# Patient Record
Sex: FEMALE | Race: BLACK OR AFRICAN AMERICAN | Employment: FULL TIME | URBAN - METROPOLITAN AREA
[De-identification: names, ages, dates, MRNs, and addresses within clinical notes are randomized per-mention and may not be internally consistent; named-entity substitution may affect disease eponyms.]

---

## 2019-04-10 ENCOUNTER — HOSPITAL ENCOUNTER (EMERGENCY)
Age: 29
Discharge: HOME | End: 2019-04-10
Payer: SELF-PAY

## 2019-04-10 VITALS
HEART RATE: 82 BPM | WEIGHT: 135 LBS | BODY MASS INDEX: 24.84 KG/M2 | TEMPERATURE: 98.6 F | SYSTOLIC BLOOD PRESSURE: 109 MMHG | DIASTOLIC BLOOD PRESSURE: 60 MMHG | OXYGEN SATURATION: 100 % | RESPIRATION RATE: 18 BRPM | HEIGHT: 62 IN

## 2019-04-10 DIAGNOSIS — K62.89 ANAL PAIN: Primary | ICD-10-CM

## 2019-04-10 DIAGNOSIS — N89.8 VAGINAL DISCHARGE: ICD-10-CM

## 2019-04-10 DIAGNOSIS — K59.00 CONSTIPATION, UNSPECIFIED CONSTIPATION TYPE: ICD-10-CM

## 2019-04-10 LAB
HCG UR QL: NEGATIVE
SERVICE CMNT-IMP: NORMAL
WET PREP GENITAL: NORMAL
WET PREP GENITAL: NORMAL

## 2019-04-10 PROCEDURE — 87210 SMEAR WET MOUNT SALINE/INK: CPT

## 2019-04-10 PROCEDURE — 96372 THER/PROPH/DIAG INJ SC/IM: CPT

## 2019-04-10 PROCEDURE — 81003 URINALYSIS AUTO W/O SCOPE: CPT

## 2019-04-10 PROCEDURE — 81025 URINE PREGNANCY TEST: CPT

## 2019-04-10 PROCEDURE — 74011250637 HC RX REV CODE- 250/637

## 2019-04-10 PROCEDURE — 99284 EMERGENCY DEPT VISIT MOD MDM: CPT

## 2019-04-10 PROCEDURE — 87491 CHLMYD TRACH DNA AMP PROBE: CPT

## 2019-04-10 PROCEDURE — 74011250636 HC RX REV CODE- 250/636

## 2019-04-10 RX ORDER — AZITHROMYCIN 250 MG/1
1000 TABLET, FILM COATED ORAL
Status: COMPLETED | OUTPATIENT
Start: 2019-04-10 | End: 2019-04-10

## 2019-04-10 RX ORDER — POLYETHYLENE GLYCOL 3350 17 G/17G
17 POWDER, FOR SOLUTION ORAL DAILY
Qty: 17 EACH | Refills: 0 | Status: SHIPPED | OUTPATIENT
Start: 2019-04-10

## 2019-04-10 RX ORDER — HYDROCORTISONE ACETATE 25 MG/1
25 SUPPOSITORY RECTAL EVERY 12 HOURS
Qty: 10 SUPPOSITORY | Refills: 0 | Status: SHIPPED | OUTPATIENT
Start: 2019-04-10

## 2019-04-10 RX ADMIN — AZITHROMYCIN 1000 MG: 250 TABLET, FILM COATED ORAL at 09:01

## 2019-04-10 RX ADMIN — LIDOCAINE HYDROCHLORIDE 250 MG: 10 INJECTION, SOLUTION INFILTRATION; PERINEURAL at 09:01

## 2019-04-10 NOTE — ED TRIAGE NOTES
Pt in states rectal pain. States she was constipated and strained to have bowel movement states now having itching to rectum. States also having vaginal discharge and itching.

## 2019-04-10 NOTE — ED PROVIDER NOTES
40-year-old female complaining of anal pain. States it may be her hemorrhoids. Although she's never been diagnosed with hemorrhoids. Patient states that she has had constipation with hard bowel movements recently, which showed that this irritated. She also check while she is here for vaginal discharge. The history is provided by the patient. Anal Pain The current episode started yesterday. The onset was sudden. The problem occurs continuously. The pain is moderate. The stool is described as hard. Associated symptoms include rectal pain. Pertinent negatives include no anorexia. History reviewed. No pertinent past medical history. Past Surgical History:  
Procedure Laterality Date  HX GYN    
 c section History reviewed. No pertinent family history. Social History Socioeconomic History  Marital status: SINGLE Spouse name: Not on file  Number of children: Not on file  Years of education: Not on file  Highest education level: Not on file Occupational History  Not on file Social Needs  Financial resource strain: Not on file  Food insecurity:  
  Worry: Not on file Inability: Not on file  Transportation needs:  
  Medical: Not on file Non-medical: Not on file Tobacco Use  Smoking status: Never Smoker  Smokeless tobacco: Never Used Substance and Sexual Activity  Alcohol use: No  
  Comment: occ  Drug use: No  
 Sexual activity: Yes Birth control/protection: None Lifestyle  Physical activity:  
  Days per week: Not on file Minutes per session: Not on file  Stress: Not on file Relationships  Social connections:  
  Talks on phone: Not on file Gets together: Not on file Attends Gnosticist service: Not on file Active member of club or organization: Not on file Attends meetings of clubs or organizations: Not on file Relationship status: Not on file  Intimate partner violence: Fear of current or ex partner: Not on file Emotionally abused: Not on file Physically abused: Not on file Forced sexual activity: Not on file Other Topics Concern  Not on file Social History Narrative  Not on file ALLERGIES: Patient has no known allergies. Review of Systems Constitutional: Negative. Negative for activity change. HENT: Negative. Eyes: Negative. Respiratory: Negative. Cardiovascular: Negative. Gastrointestinal: Positive for rectal pain. Negative for anorexia. Genitourinary: Negative. Musculoskeletal: Negative. Skin: Negative. Neurological: Negative. Psychiatric/Behavioral: Negative. All other systems reviewed and are negative. Vitals:  
 04/10/19 3636 04/10/19 1529 BP: 130/60 Pulse: 88 Resp: 18 Temp: 98.6 °F (37 °C) SpO2: 99% 99% Weight: 61.2 kg (135 lb) Height: 5' 2\" (1.575 m) Physical Exam  
Constitutional: She is oriented to person, place, and time. She appears well-developed and well-nourished. No distress. HENT:  
Head: Normocephalic and atraumatic. Right Ear: External ear normal.  
Left Ear: External ear normal.  
Nose: Nose normal.  
Mouth/Throat: Oropharynx is clear and moist. No oropharyngeal exudate. Eyes: Pupils are equal, round, and reactive to light. Conjunctivae and EOM are normal. Right eye exhibits no discharge. Left eye exhibits no discharge. No scleral icterus. Neck: Normal range of motion. Neck supple. No JVD present. No tracheal deviation present. Cardiovascular: Normal rate, regular rhythm and intact distal pulses. Pulmonary/Chest: Effort normal and breath sounds normal. No stridor. No respiratory distress. She has no wheezes. She exhibits no tenderness. Abdominal: Soft. Bowel sounds are normal. She exhibits no distension and no mass. There is no tenderness. Genitourinary: Rectal exam shows no external hemorrhoid.  There is no tenderness, lesion or injury on the right labia. There is no tenderness, lesion or injury on the left labia. Cervix exhibits no discharge. Right adnexum displays no mass, no tenderness and no fullness. Left adnexum displays no mass, no tenderness and no fullness. No erythema, tenderness or bleeding in the vagina. No foreign body in the vagina. No signs of injury around the vagina. No vaginal discharge found. Musculoskeletal: Normal range of motion. She exhibits no edema or tenderness. Neurological: She is alert and oriented to person, place, and time. No cranial nerve deficit. Skin: Skin is warm and dry. No rash noted. She is not diaphoretic. No erythema. No pallor. Psychiatric: She has a normal mood and affect. Her behavior is normal. Thought content normal.  
Nursing note and vitals reviewed. MDM Number of Diagnoses or Management Options Diagnosis management comments: Anal pain, constipation, vaginal discharge. Plan: [facility] [place]. cover for STD check for trichomoniasis and BV. Plan: Encourage patient to see a primary care doctor for nonemergency conditions. Amount and/or Complexity of Data Reviewed Clinical lab tests: ordered and reviewed Tests in the medicine section of CPT®: ordered and reviewed Risk of Complications, Morbidity, and/or Mortality Presenting problems: low Diagnostic procedures: moderate Management options: low Procedures

## 2019-04-10 NOTE — DISCHARGE INSTRUCTIONS
Please obtain and follow-up with a primary care physician. Please see your primary care physician or urgent care for nonemergent conditions. Seek care in the emergency room any time you feel you have a true emergency. Constipation: Care Instructions  Your Care Instructions    Constipation means that you have a hard time passing stools (bowel movements). People pass stools from 3 times a day to once every 3 days. What is normal for you may be different. Constipation may occur with pain in the rectum and cramping. The pain may get worse when you try to pass stools. Sometimes there are small amounts of bright red blood on toilet paper or the surface of stools. This is because of enlarged veins near the rectum (hemorrhoids). A few changes in your diet and lifestyle may help you avoid ongoing constipation. Your doctor may also prescribe medicine to help loosen your stool. Some medicines can cause constipation. These include pain medicines and antidepressants. Tell your doctor about all the medicines you take. Your doctor may want to make a medicine change to ease your symptoms. Follow-up care is a key part of your treatment and safety. Be sure to make and go to all appointments, and call your doctor if you are having problems. It's also a good idea to know your test results and keep a list of the medicines you take. How can you care for yourself at home? · Drink plenty of fluids, enough so that your urine is light yellow or clear like water. If you have kidney, heart, or liver disease and have to limit fluids, talk with your doctor before you increase the amount of fluids you drink. · Include high-fiber foods in your diet each day. These include fruits, vegetables, beans, and whole grains. · Get at least 30 minutes of exercise on most days of the week. Walking is a good choice. You also may want to do other activities, such as running, swimming, cycling, or playing tennis or team sports.   · Take a fiber supplement, such as Citrucel or Metamucil, every day. Read and follow all instructions on the label. · Schedule time each day for a bowel movement. A daily routine may help. Take your time having your bowel movement. · Support your feet with a small step stool when you sit on the toilet. This helps flex your hips and places your pelvis in a squatting position. · Your doctor may recommend an over-the-counter laxative to relieve your constipation. Examples are Milk of Magnesia and MiraLax. Read and follow all instructions on the label. Do not use laxatives on a long-term basis. When should you call for help? Call your doctor now or seek immediate medical care if:    · You have new or worse belly pain.     · You have new or worse nausea or vomiting.     · You have blood in your stools.    Watch closely for changes in your health, and be sure to contact your doctor if:    · Your constipation is getting worse.     · You do not get better as expected. Where can you learn more? Go to http://slick-barak.info/. Enter 21 [phone] in the search box to learn more about \"Constipation: Care Instructions. \"  Current as of: September 23, 2018  Content Version: 11.9  © 6965-5880 [employer]. Care instructions adapted under license by [employer] (which disclaims liability or warranty for this information). If you have questions about a medical condition or this instruction, always ask your healthcare professional. [place] [address] any warranty or liability for your use of this information. Patient Education        Anal Pain: Care Instructions  Your Care Instructions  Pain in the opening to the rectum (anus) can be caused by diarrhea or constipation or by scratching a rectal itch. A common cause of anal pain is a tear in the lining of the lower rectum (anal fissure). This type of anal pain usually goes away when the problem clears up.  Injury during anal sex or from an object being placed in the rectum also can cause pain. A rare cause of anal pain is spasms of the muscles in the rectum. Some of these conditions may cause some light bleeding. Home treatment usually can relieve anal pain. If you continue to have anal pain, your doctor may prescribe medicine to relieve pain and other symptoms. Depending on the cause, you may need other treatment. Follow-up care is a key part of your treatment and safety. Be sure to make and go to all appointments, and call your doctor if you are having problems. It's also a good idea to know your test results and keep a list of the medicines you take. How can you care for yourself at home? · Sit in a few inches of warm water (sitz bath) 3 times a day and after bowel movements. The warm water eases discomfort. Do not put soaps, salts, or shampoos in the water. · Drink plenty of fluids, enough so that your urine is light yellow or clear like water. If you have kidney, heart, or liver disease and have to limit fluids, talk with your doctor before you increase the amount of fluids you drink. · Include high-fiber foods, such as fruits, vegetables, beans, and whole grains, in your diet each day. · Take a fiber supplement, such as Benefiber, Citrucel, or Metamucil, every day. Read and follow all instructions on the label. · Use the toilet when you feel the urge. Or when you can, schedule time each day for a bowel movement. A daily routine may help. Take your time and do not strain when having a bowel movement. But do not sit on the toilet too long. · Support your feet with a small step stool when you sit on the toilet. This helps flex your hips and places your pelvis in a squatting position. · Your doctor may recommend an over-the-counter laxative, such as Miralax, Milk of Magnesia, or Ex-Lax. Read and follow all instructions on the label, and do not use laxatives on a long-term basis.   · Do not use over-the-counter ointments or creams without talking to your doctor. Some of these may not help. · Use baby wipes or medicated pads, such as Preparation H or Tucks, instead of toilet paper to clean after a bowel movement. These products do not irritate the anus. · Be safe with medicines. Read and follow all instructions on the label. ? If the doctor gave you a prescription medicine for pain, give it as prescribed. ? If you are not taking a prescription pain medicine, ask your doctor if you can take an over-the-counter medicine. When should you call for help? Call your doctor now or seek immediate medical care if:    · You have new or worse pain.     · You have new or worse bleeding from the rectum.    Watch closely for changes in your health, and be sure to contact your doctor if:    · You have trouble passing stools.     · You do not get better as expected. Where can you learn more? Go to http://slick-barak.info/. Enter 486 2310 in the search box to learn more about \"Anal Pain: Care Instructions. \"  Current as of: March 27, 2018  Content Version: 11.9  © 0508-5660 [employer]. Care instructions adapted under license by [employer] (which disclaims liability or warranty for this information). If you have questions about a medical condition or this instruction, always ask your healthcare professional. [address] any warranty or liability for your use of this information.

## 2019-04-10 NOTE — ED NOTES
I have reviewed discharge instructions with the patient. The patient verbalized understanding. Patient left ED via Discharge Method: ambulatory to Home with self. Opportunity for questions and clarification provided. Patient given 2 scripts. To continue your aftercare when you leave the hospital, you may receive an automated call from our care team to check in on how you are doing. This is a free service and part of our promise to provide the best care and service to meet your aftercare needs.  If you have questions, or wish to unsubscribe from this service please call [phone]. Thank you for Choosing our [facility] Emergency Department.

## 2019-04-12 LAB
C TRACH RRNA SPEC QL NAA+PROBE: NEGATIVE
N GONORRHOEA RRNA SPEC QL NAA+PROBE: NEGATIVE
SPECIMEN SOURCE: NORMAL

## 2022-07-10 ENCOUNTER — HOSPITAL ENCOUNTER (EMERGENCY)
Age: 32
Discharge: HOME | End: 2022-07-10
Attending: EMERGENCY MEDICINE

## 2022-07-10 VITALS
TEMPERATURE: 97.8 F | HEART RATE: 81 BPM | WEIGHT: 135 LBS | DIASTOLIC BLOOD PRESSURE: 79 MMHG | HEIGHT: 62 IN | OXYGEN SATURATION: 99 % | SYSTOLIC BLOOD PRESSURE: 121 MMHG | BODY MASS INDEX: 24.84 KG/M2 | RESPIRATION RATE: 22 BRPM

## 2022-07-10 DIAGNOSIS — N93.9 ABNORMAL VAGINAL BLEEDING: Primary | ICD-10-CM

## 2022-07-10 DIAGNOSIS — O20.0 ABORTION, THREATENED, EARLY PREGNANCY: ICD-10-CM

## 2022-07-10 LAB
APPEARANCE UR: ABNORMAL
BACTERIA URNS QL MICRO: NEGATIVE /HPF
BILIRUB UR QL: NEGATIVE
CASTS URNS QL MICRO: ABNORMAL /LPF
COLOR UR: ABNORMAL
EPI CELLS #/AREA URNS HPF: ABNORMAL /HPF
GLUCOSE UR STRIP.AUTO-MCNC: NEGATIVE MG/DL
HCG SERPL-ACNC: 7 MIU/ML (ref 0–6)
HGB UR QL STRIP: ABNORMAL
KETONES UR QL STRIP.AUTO: ABNORMAL MG/DL
LEUKOCYTE ESTERASE UR QL STRIP.AUTO: ABNORMAL
NITRITE UR QL STRIP.AUTO: NEGATIVE
PH UR STRIP: 5 [PH] (ref 5–9)
PROT UR STRIP-MCNC: ABNORMAL MG/DL
RBC #/AREA URNS HPF: >100 /HPF
SP GR UR REFRACTOMETRY: 1.03 (ref 1–1.02)
UROBILINOGEN UR QL STRIP.AUTO: 1 EU/DL (ref 0.2–1)
WBC URNS QL MICRO: ABNORMAL /HPF

## 2022-07-10 PROCEDURE — 81001 URINALYSIS AUTO W/SCOPE: CPT

## 2022-07-10 PROCEDURE — 84702 CHORIONIC GONADOTROPIN TEST: CPT

## 2022-07-10 PROCEDURE — 99283 EMERGENCY DEPT VISIT LOW MDM: CPT

## 2022-07-10 ASSESSMENT — PAIN - FUNCTIONAL ASSESSMENT: PAIN_FUNCTIONAL_ASSESSMENT: 0-10

## 2022-07-10 ASSESSMENT — PAIN DESCRIPTION - LOCATION: LOCATION: ABDOMEN

## 2022-07-10 ASSESSMENT — PAIN SCALES - GENERAL: PAINLEVEL_OUTOF10: 4

## 2022-07-10 NOTE — ED TRIAGE NOTES
Last period was June 5th/ just found out preg with + test a few days ago/ pt is bleeding heavily but no clots/ occasional cramps

## 2022-07-10 NOTE — ED PROVIDER NOTES
reviewed and are negative. Review of Systems   Genitourinary: Positive for vaginal bleeding. All other systems reviewed and are negative. No past medical history on file. No past surgical history on file. No family history on file. Social Connections:     Frequency of Communication with Friends and Family: Not on file    Frequency of Social Gatherings with Friends and Family: Not on file    Attends Advent Services: Not on file    Active Member of Clubs or Organizations: Not on file    Attends Club or Organization Meetings: Not on file    Marital Status: Not on file        No Known Allergies     Vitals signs and nursing note reviewed. Patient Vitals for the past 4 hrs:   Temp Pulse Resp BP SpO2   07/10/22 0415 97.8 °F (36.6 °C) 73 18 114/69 98 %          Physical Exam     GENERAL:The patient has Body mass index is 24.69 kg/m². Well-hydrated. No acute distress. VITAL SIGNS: Heart rate, blood pressure, respiratory rate reviewed as recorded in  nurse's notes  EYES: Pupils reactive. Extraocular motion intact. No conjunctival redness or drainage. LUNGS: No accessory muscle use  CARDIOVASCULAR: Regular rate and rhythm  ABD: No guarding or rigidity present. EXTREMITIES: Pt moving all 4 extremities with out limitations. Normal muscle tone. NEUROLOGIC: Cranial nerve exam reveals face is symmetrical, tongue is midline  speech is clear. No focal deficits noted  SKIN: No rash or petechiae. Good skin turgor palpated. PSYCHIATRIC: Alert and oriented. Appropriate behavior and judgment.       Procedures      Labs Reviewed   URINALYSIS - Abnormal; Notable for the following components:       Result Value    Specific Gravity, UA 1.032 (*)     Protein, UA TRACE (*)     Ketones, Urine TRACE (*)     Blood, Urine LARGE (*)     Leukocyte Esterase, Urine SMALL (*)     RBC, UA >100 (*)     All other components within normal limits   HCG, QUANTITATIVE, PREGNANCY - Abnormal; Notable for the following components:    hCG Quant 7 (*)     All other components within normal limits   POC PREGNANCY UR-QUAL   POCT URINALYSIS DIPSTICK        No orders to display                            Voice dictation software was used during the making of this note. This software is not perfect and grammatical and other typographical errors may be present. This note has not been completely proofread for errors.        Desiree Beach DO  07/10/22 0600

## 2022-07-10 NOTE — ED NOTES
I have reviewed discharge instructions with the patient. The patient verbalized understanding. Patient left ED via Discharge Method: ambulatory to Home with self. Opportunity for questions and clarification provided. Patient given 0 scripts. To continue your aftercare when you leave the hospital, you may receive an automated call from our care team to check in on how you are doing. This is a free service and part of our promise to provide the best care and service to meet your aftercare needs.  If you have questions, or wish to unsubscribe from this service please call [phone]. Thank you for Choosing our [facility] Emergency Department.         Sarah Saba RN  07/10/22 3311

## 2023-02-28 ENCOUNTER — APPOINTMENT (OUTPATIENT)
Dept: ULTRASOUND IMAGING | Age: 33
End: 2023-02-28
Payer: MEDICAID

## 2023-02-28 ENCOUNTER — HOSPITAL ENCOUNTER (EMERGENCY)
Age: 33
Discharge: HOME | End: 2023-03-01
Attending: EMERGENCY MEDICINE
Payer: MEDICAID

## 2023-02-28 DIAGNOSIS — O46.90 VAGINAL BLEEDING IN PREGNANCY: ICD-10-CM

## 2023-02-28 DIAGNOSIS — A59.9 TRICHOMONIASIS: ICD-10-CM

## 2023-02-28 DIAGNOSIS — B96.89 BV (BACTERIAL VAGINOSIS): ICD-10-CM

## 2023-02-28 DIAGNOSIS — N76.0 BV (BACTERIAL VAGINOSIS): ICD-10-CM

## 2023-02-28 DIAGNOSIS — N39.0 URINARY TRACT INFECTION WITHOUT HEMATURIA, SITE UNSPECIFIED: Primary | ICD-10-CM

## 2023-02-28 LAB
ABO + RH BLD: NORMAL
ANION GAP SERPL CALC-SCNC: 8 MMOL/L (ref 2–11)
APPEARANCE UR: ABNORMAL
BACTERIA URNS QL MICRO: ABNORMAL /HPF
BILIRUB UR QL: NEGATIVE
BUN SERPL-MCNC: 11 MG/DL (ref 6–23)
CALCIUM SERPL-MCNC: 9.1 MG/DL (ref 8.3–10.4)
CHLORIDE SERPL-SCNC: 108 MMOL/L (ref 101–110)
CO2 SERPL-SCNC: 20 MMOL/L (ref 21–32)
COLOR UR: ABNORMAL
CREAT SERPL-MCNC: 0.74 MG/DL (ref 0.6–1)
EPI CELLS #/AREA URNS HPF: ABNORMAL /HPF
ERYTHROCYTE [DISTWIDTH] IN BLOOD BY AUTOMATED COUNT: 19.3 % (ref 11.9–14.6)
GLUCOSE SERPL-MCNC: 84 MG/DL (ref 65–100)
GLUCOSE UR STRIP.AUTO-MCNC: NEGATIVE MG/DL
HCG SERPL-ACNC: 1766 MIU/ML (ref 0–6)
HCT VFR BLD AUTO: 32.1 % (ref 35.8–46.3)
HGB BLD-MCNC: 9.6 G/DL (ref 11.7–15.4)
HGB UR QL STRIP: ABNORMAL
KETONES UR QL STRIP.AUTO: NEGATIVE MG/DL
LEUKOCYTE ESTERASE UR QL STRIP.AUTO: ABNORMAL
MCH RBC QN AUTO: 22.7 PG (ref 26.1–32.9)
MCHC RBC AUTO-ENTMCNC: 29.9 G/DL (ref 31.4–35)
MCV RBC AUTO: 75.9 FL (ref 82–102)
NITRITE UR QL STRIP.AUTO: NEGATIVE
NRBC # BLD: 0 K/UL (ref 0–0.2)
PH UR STRIP: 8 (ref 5–9)
PLATELET # BLD AUTO: 306 K/UL (ref 150–450)
PMV BLD AUTO: 11.3 FL (ref 9.4–12.3)
POTASSIUM SERPL-SCNC: 4.1 MMOL/L (ref 3.5–5.1)
PROT UR STRIP-MCNC: NEGATIVE MG/DL
RBC # BLD AUTO: 4.23 M/UL (ref 4.05–5.2)
RBC #/AREA URNS HPF: ABNORMAL /HPF
SERVICE CMNT-IMP: NORMAL
SODIUM SERPL-SCNC: 136 MMOL/L (ref 133–143)
SP GR UR REFRACTOMETRY: 1.02 (ref 1–1.02)
UROBILINOGEN UR QL STRIP.AUTO: 1 EU/DL (ref 0.2–1)
WBC # BLD AUTO: 7.6 K/UL (ref 4.3–11.1)
WBC URNS QL MICRO: ABNORMAL /HPF
WET PREP GENITAL: NORMAL

## 2023-02-28 PROCEDURE — 2580000003 HC RX 258: Performed by: PHYSICIAN ASSISTANT

## 2023-02-28 PROCEDURE — 80048 BASIC METABOLIC PNL TOTAL CA: CPT

## 2023-02-28 PROCEDURE — 87086 URINE CULTURE/COLONY COUNT: CPT

## 2023-02-28 PROCEDURE — 84702 CHORIONIC GONADOTROPIN TEST: CPT

## 2023-02-28 PROCEDURE — 86900 BLOOD TYPING SEROLOGIC ABO: CPT

## 2023-02-28 PROCEDURE — 6360000002 HC RX W HCPCS: Performed by: PHYSICIAN ASSISTANT

## 2023-02-28 PROCEDURE — 96365 THER/PROPH/DIAG IV INF INIT: CPT

## 2023-02-28 PROCEDURE — 85027 COMPLETE CBC AUTOMATED: CPT

## 2023-02-28 PROCEDURE — 81001 URINALYSIS AUTO W/SCOPE: CPT

## 2023-02-28 PROCEDURE — 99284 EMERGENCY DEPT VISIT MOD MDM: CPT

## 2023-02-28 PROCEDURE — 87210 SMEAR WET MOUNT SALINE/INK: CPT

## 2023-02-28 PROCEDURE — 87591 N.GONORRHOEAE DNA AMP PROB: CPT

## 2023-02-28 PROCEDURE — 76815 OB US LIMITED FETUS(S): CPT

## 2023-02-28 RX ORDER — METRONIDAZOLE 500 MG/1
500 TABLET ORAL 2 TIMES DAILY
Qty: 14 TABLET | Refills: 0 | Status: SHIPPED | OUTPATIENT
Start: 2023-02-28 | End: 2023-03-07

## 2023-02-28 RX ORDER — AZITHROMYCIN 1 G
1 PACKET (EA) ORAL ONCE
Qty: 1 EACH | Refills: 0 | Status: SHIPPED | OUTPATIENT
Start: 2023-03-01 | End: 2023-03-01

## 2023-02-28 RX ADMIN — CEFTRIAXONE 1000 MG: 1 INJECTION, POWDER, FOR SOLUTION INTRAMUSCULAR; INTRAVENOUS at 23:47

## 2023-02-28 ASSESSMENT — ENCOUNTER SYMPTOMS
ABDOMINAL PAIN: 0
BACK PAIN: 0
GASTROINTESTINAL NEGATIVE: 1
NAUSEA: 0
RESPIRATORY NEGATIVE: 1
ALLERGIC/IMMUNOLOGIC NEGATIVE: 1
EYES NEGATIVE: 1

## 2023-02-28 ASSESSMENT — PAIN - FUNCTIONAL ASSESSMENT: PAIN_FUNCTIONAL_ASSESSMENT: NONE - DENIES PAIN

## 2023-02-28 NOTE — Clinical Note
Black Salazar was seen and treated in our emergency department on 2/28/2023. She may return to work on 03/03/2023. If you have any questions or concerns, please don't hesitate to call.       KEO Jacome

## 2023-03-01 VITALS
OXYGEN SATURATION: 100 % | SYSTOLIC BLOOD PRESSURE: 95 MMHG | DIASTOLIC BLOOD PRESSURE: 59 MMHG | WEIGHT: 130 LBS | HEART RATE: 96 BPM | RESPIRATION RATE: 14 BRPM | HEIGHT: 62 IN | TEMPERATURE: 98.1 F | BODY MASS INDEX: 23.92 KG/M2

## 2023-03-01 NOTE — DISCHARGE INSTRUCTIONS
Drink plenty fluids, rest, follow-up with OB for recheck. You will need a repeat quantitative hCG in 48 hours and repeat ultrasound. Return to the ED if worsening in any way. Finish all the antibiotics. No intercourse until the cultures have returned. Partner will need treatment for trichomonas.

## 2023-03-01 NOTE — ED TRIAGE NOTES
PT states she has had vaginal bleeding different from her period and states she has had multiple positive pregnancy tests at home. PT unsure if she is having miscarriage.

## 2023-03-01 NOTE — ED PROVIDER NOTES
Emergency Department Provider Note                   PCP:                On File Not (Inactive)               Age: 35 y.o. Sex: female       ICD-10-CM    1. Urinary tract infection without hematuria, site unspecified  N39.0       2. Trichomoniasis  A59.9       3. BV (bacterial vaginosis)  N76.0     B96.89       4. Vaginal bleeding in pregnancy  O46.90           DISPOSITION Decision To Discharge 03/01/2023 12:25:53 AM       MEDICAL DECISION MAKING  Complexity of Problems Addressed:  1 or more acute illnesses that pose a threat to life or bodily function. Patient with a urinary tract infection, trichomonas, BV and vaginal bleeding in pregnancy. She was given IV ceftriaxone here and will be sent home with medication for trichomonas and BV. GC cultures are pending. She was instructed to avoid intercourse until all the cultures have returned and partner may need treatment as well. She was encouraged to drink plenty of fluids, rest, use over-the-counter Tylenol as directed if needed for pain and return to the ED if worsening in any way. She was sent with azithromycin at home for possible chlamydia as well. She was sent with prenatal multivitamins to take daily with folic acid and instructed to call her OB for follow-up. Patient expresses understanding. She is stable for discharge, feeling much better and ready to go home. She is ambulatory out of the ED without difficulty at this time. Patient presented with acute vaginal bleeding in early pregnancy to the ED. History obtained from patient, additional history provided by independent historian none. I reviewed the following tests labs and ultrasound. My independent interpretation of the data was done. I did review records from an external source. I did engage in shared decision making with the patient/family. I considered further evaluation but did not do it due to patient wanting to leave and go home as well as patient feeling much better.  There is no social determinant of health affecting care. I did not use a clinical guideline to determine care of the patient. I discussed results/disposition with patient. Data Reviewed and Analyzed:  Category 1:     I ordered each unique test.  I reviewed the results of each unique test.            Category 3: Discussion of management or test interpretation. above    Risk of Complications and/or Morbidity of Patient Management:  OTC drug management completed and Prescription drug management completed     Felisa Lazcano is a 35 y.o. female who presents to the Emergency Department with chief complaint of    Chief Complaint   Patient presents with    Vaginal Bleeding      Patient is here with vaginal bleeding that started yesterday. She had 4 positive home pregnancy test.  Her last menstrual cycle started . She is not having any pain. This is her sixth pregnancy. She has 2 kids, 1 , 2 miscarriages and this pregnancy. No fever. No trouble with urination or bowel movements, chest pain, shortness of breath, abdominal pain, dizziness, weakness, dyspnea exertion, orthopnea, swelling/tingling or weakness to her arms or legs or other new symptoms. She did ambulate to the room without difficulty and is well-hydrated. The history is provided by the patient.    Vaginal Bleeding  Quality:  Bright red  Severity:  Mild  Onset quality:  Gradual  Duration:  1 day  Chronicity:  New  Possible pregnancy: yes    Context: spontaneously    Relieved by:  Nothing  Worsened by:  Nothing  Ineffective treatments:  None tried  Associated symptoms: no abdominal pain, no back pain, no dizziness, no dyspareunia, no dysuria, no fatigue, no fever, no nausea and no vaginal discharge    Risk factors: hx of endometriosis, multiple partners, prior miscarriage and terminated pregnancy    Risk factors: no bleeding disorder, no hx of ectopic pregnancy, no gynecological surgery, no new sexual partner, no ovarian cysts, no ovarian torsion, no PID, no STD and no STD exposure       Review of Systems   Constitutional: Negative. Negative for fatigue and fever. HENT: Negative. Eyes: Negative. Respiratory: Negative. Cardiovascular: Negative. Gastrointestinal: Negative. Negative for abdominal pain and nausea. Endocrine: Negative. Genitourinary:  Positive for vaginal bleeding. Negative for dyspareunia, dysuria and vaginal discharge. Musculoskeletal: Negative. Negative for back pain. Skin: Negative. Allergic/Immunologic: Negative. Neurological: Negative. Negative for dizziness. Hematological: Negative. Psychiatric/Behavioral: Negative. All other systems reviewed and are negative. Vitals signs and nursing note reviewed. No data found. Physical Exam  Vitals and nursing note reviewed. Exam conducted with a chaperone present (Bishop Edwards). Constitutional:       Appearance: Normal appearance. HENT:      Head: Normocephalic and atraumatic. Right Ear: External ear normal.      Left Ear: External ear normal.      Nose: Nose normal.      Mouth/Throat:      Mouth: Mucous membranes are moist.   Eyes:      Extraocular Movements: Extraocular movements intact. Conjunctiva/sclera: Conjunctivae normal.      Pupils: Pupils are equal, round, and reactive to light. Cardiovascular:      Rate and Rhythm: Normal rate. Pulses: Normal pulses. Pulmonary:      Effort: Pulmonary effort is normal.   Abdominal:      General: Abdomen is flat. Palpations: Abdomen is soft. Tenderness: There is no abdominal tenderness. Genitourinary:     Labia:         Right: No tenderness. Left: No tenderness. Vagina: Bleeding present. Cervix: Cervical bleeding (mild amuont of vaginal bleeding) present. Uterus: Enlarged. Not tender. Adnexa: Right adnexa normal and left adnexa normal.   Musculoskeletal:         General: Normal range of motion.       Cervical back: Normal range of motion. Skin:     General: Skin is warm. Capillary Refill: Capillary refill takes less than 2 seconds. Neurological:      General: No focal deficit present. Mental Status: She is alert and oriented to person, place, and time. Mental status is at baseline. Psychiatric:         Mood and Affect: Mood normal.         Behavior: Behavior normal.         Thought Content: Thought content normal.         Judgment: Judgment normal.        Procedures          Orders Placed This Encounter   Procedures    Wet Prep    C.trachomatis N.gonorrhoeae DNA    Culture, Urine    US OB 1 OR MORE FETUS LIMITED    Urinalysis w rflx microscopic    Basic Metabolic Panel    CBC    HCG, Quantitative, Pregnancy    POCT Urine Dipstick    POC PREGNANCY UR-QUAL    PELVIC EXAM SET UP    ABO/RH    Saline lock IV        Medications   cefTRIAXone (ROCEPHIN) 1,000 mg in sodium chloride 0.9 % 50 mL IVPB (mini-bag) (0 mg IntraVENous Stopped 3/1/23 0017)       Discharge Medication List as of 3/1/2023 12:01 AM        START taking these medications    Details   azithromycin (ZITHROMAX) 1 g powder Take 1 Package by mouth once for 1 dose, Disp-1 each, R-0Print      metroNIDAZOLE (FLAGYL) 500 MG tablet Take 1 tablet by mouth 2 times daily for 7 days, Disp-14 tablet, R-0Print      Prenatal MV-Min-Fe Fum-FA-DHA (PRENATAL MULTI +DHA) 27-0.8-200 MG CAPS Take 1 tablet by mouth daily, Disp-30 capsule, R-0Print              History reviewed. No pertinent past medical history. History reviewed. No pertinent surgical history. History reviewed. No pertinent family history.      Social History     Socioeconomic History    Marital status: Single     Spouse name: None    Number of children: None    Years of education: None    Highest education level: None   Tobacco Use    Smoking status: Never    Smokeless tobacco: Never   Substance and Sexual Activity    Alcohol use: Never    Drug use: Never        Allergies: Patient has no known allergies. Discharge Medication List as of 3/1/2023 12:01 AM        CONTINUE these medications which have NOT CHANGED    Details   hydrocortisone (ANUSOL-HC) 25 MG suppository Place 25 mg rectally every 12 hoursHistorical Med      polyethylene glycol (GLYCOLAX) 17 GM/SCOOP powder Take 17 g by mouth dailyHistorical Med              Results for orders placed or performed during the hospital encounter of 02/28/23   Wet Prep    Specimen: Vaginal   Result Value Ref Range    Special Requests NO SPECIAL REQUESTS      Wet Prep FEW  CLUE CELLS PRESENT        Wet Prep MANY  MOTILE TRICHOMONAS NOTED        Wet Prep NO YEAST SEEN      Wet Prep 1 TO 5 WBCS PER HPF    Culture, Urine    Specimen: Urine, clean catch   Result Value Ref Range    Special Requests NO SPECIAL REQUESTS      Culture        50,000-100,000 COLONIES/mL MIXED SKIN AUSTIN ISOLATED   US OB 1 OR MORE FETUS LIMITED    Narrative    EXAMINATION: US OB 1 OR MORE FETUS LIMITED    DATE OF EXAMINATION:  2/28/2023 9:29 PM    INDICATION:  Pelvic pain, vaginal bleeding in the setting of pregnancy     TECHNIQUE: Transabdominal and endovaginal images were obtained. Grayscale,   Doppler, and color Duplex Doppler with spectral analysis for evaluation of   arterial and venous ovarian flow was performed. COMPARISON: None. FINDINGS:     Uterus: The uterus measures 12.5 x 8.5 x 9.3 cm. There are multiple   heterogeneous hypoechoic masses, for example in the anterior uterine fundus   measuring up to 4.3 cm and in the posterior uterine fundus measuring 4.7 cm. .     Endometrium: The endometrial stripe is thickened to 37 mm. No internal   vascularity. No gestational sac visible. Right Ovary: The right ovary measures 2.8 x 1.9 x 2.5 cm. Doppler flow shows   normal arterial and venous waveforms and color flow. There is no mass. Left Ovary: The left ovary measures 2.9 x 2.2 x 2.5 cm. Doppler flow shows   normal arterial and venous waveforms and color flow.  1.9 cm probable corpus   luteal cyst within the left ovary. .     Free Fluid: Small amount of simple free fluid in the right adnexa. .         Impression    1. Pregnancy of unknown location. Recommend serial beta-hCG. 2.  Markedly thickened endometrial stripe. Recommend follow-up for resolution. Melanie Montenegro M.D.   2/28/2023 11:33:00 PM   Urinalysis w rflx microscopic   Result Value Ref Range    Color, UA YELLOW/STRAW      Appearance CLOUDY      Specific Gravity, UA 1.018 1.001 - 1.023      pH, Urine 8.0 5.0 - 9.0      Protein, UA Negative NEG mg/dL    Glucose, UA Negative mg/dL    Ketones, Urine Negative NEG mg/dL    Bilirubin Urine Negative NEG      Blood, Urine SMALL (A) NEG      Urobilinogen, Urine 1.0 0.2 - 1.0 EU/dL    Nitrite, Urine Negative NEG      Leukocyte Esterase, Urine MODERATE (A) NEG      WBC, UA 10-20 0 /hpf    RBC, UA 0-3 0 /hpf    Epithelial Cells UA 0-3 0 /hpf    BACTERIA, URINE 3+ (H) 0 /hpf   Basic Metabolic Panel   Result Value Ref Range    Sodium 136 133 - 143 mmol/L    Potassium 4.1 3.5 - 5.1 mmol/L    Chloride 108 101 - 110 mmol/L    CO2 20 (L) 21 - 32 mmol/L    Anion Gap 8 2 - 11 mmol/L    Glucose 84 65 - 100 mg/dL    BUN 11 6 - 23 MG/DL    Creatinine 0.74 0.6 - 1.0 MG/DL    Est, Glom Filt Rate >60 >60 ml/min/1.73m2    Calcium 9.1 8.3 - 10.4 MG/DL   CBC   Result Value Ref Range    WBC 7.6 4.3 - 11.1 K/uL    RBC 4.23 4.05 - 5.2 M/uL    Hemoglobin 9.6 (L) 11.7 - 15.4 g/dL    Hematocrit 32.1 (L) 35.8 - 46.3 %    MCV 75.9 (L) 82.0 - 102.0 FL    MCH 22.7 (L) 26.1 - 32.9 PG    MCHC 29.9 (L) 31.4 - 35.0 g/dL    RDW 19.3 (H) 11.9 - 14.6 %    Platelets 210 343 - 319 K/uL    MPV 11.3 9.4 - 12.3 FL    nRBC 0.00 0.0 - 0.2 K/uL   HCG, Quantitative, Pregnancy   Result Value Ref Range    hCG Quant 1,766 (H) 0.0 - 6.0 MIU/ML   ABO/RH   Result Value Ref Range    ABO/Rh O POSITIVE         US OB 1 OR MORE FETUS LIMITED   Final Result      1. Pregnancy of unknown location. Recommend serial beta-hCG. 2.  Markedly thickened endometrial stripe. Recommend follow-up for resolution. Arnoldo Beavers M.D.    2/28/2023 11:33:00 PM                        Voice dictation software was used during the making of this note. This software is not perfect and grammatical and other typographical errors may be present. This note has not been completely proofread for errors.      Severa Greenland, PA  03/02/23 1006

## 2023-03-01 NOTE — ED NOTES
I have reviewed discharge instructions with the patient. The patient verbalized understanding. Patient left ED via Discharge Method: ambulatory to Home with self. Opportunity for questions and clarification provided. Patient given 3 scripts. To continue your aftercare when you leave the hospital, you may receive an automated call from our care team to check in on how you are doing. This is a free service and part of our promise to provide the best care and service to meet your aftercare needs.  If you have questions, or wish to unsubscribe from this service please call [phone]. Thank you for Choosing our [facility] Emergency Department.         Jose Antunez RN  03/01/23 5296

## 2023-03-03 LAB
BACTERIA SPEC CULT: NORMAL
BACTERIA SPEC CULT: NORMAL
C TRACH RRNA SPEC QL NAA+PROBE: NEGATIVE
N GONORRHOEA RRNA SPEC QL NAA+PROBE: NEGATIVE
SERVICE CMNT-IMP: NORMAL
SPECIMEN SOURCE: NORMAL

## 2023-05-30 ENCOUNTER — APPOINTMENT (OUTPATIENT)
Dept: GENERAL RADIOLOGY | Age: 33
End: 2023-05-30

## 2023-05-30 ENCOUNTER — HOSPITAL ENCOUNTER (EMERGENCY)
Age: 33
Discharge: HOME | End: 2023-05-30
Attending: EMERGENCY MEDICINE
Payer: MEDICAID

## 2023-05-30 VITALS
OXYGEN SATURATION: 97 % | BODY MASS INDEX: 23.92 KG/M2 | HEART RATE: 70 BPM | WEIGHT: 130 LBS | RESPIRATION RATE: 12 BRPM | DIASTOLIC BLOOD PRESSURE: 76 MMHG | SYSTOLIC BLOOD PRESSURE: 101 MMHG | HEIGHT: 62 IN | TEMPERATURE: 98.3 F

## 2023-05-30 DIAGNOSIS — R55 SYNCOPE AND COLLAPSE: Primary | ICD-10-CM

## 2023-05-30 DIAGNOSIS — N93.9 ABNORMAL VAGINAL BLEEDING: ICD-10-CM

## 2023-05-30 DIAGNOSIS — D62 ANEMIA DUE TO ACUTE BLOOD LOSS: ICD-10-CM

## 2023-05-30 LAB
ALBUMIN SERPL-MCNC: 3.9 G/DL (ref 3.5–5)
ALBUMIN/GLOB SERPL: 1.1 (ref 0.4–1.6)
ALP SERPL-CCNC: 67 U/L (ref 50–130)
ALT SERPL-CCNC: 30 U/L (ref 12–65)
ANION GAP SERPL CALC-SCNC: 7 MMOL/L (ref 2–11)
AST SERPL-CCNC: 15 U/L (ref 15–37)
BASOPHILS # BLD: 0.1 K/UL (ref 0–0.2)
BASOPHILS NFR BLD: 1 % (ref 0–2)
BILIRUB SERPL-MCNC: 0.3 MG/DL (ref 0.2–1.1)
BUN SERPL-MCNC: 13 MG/DL (ref 6–23)
CALCIUM SERPL-MCNC: 9.2 MG/DL (ref 8.3–10.4)
CHLORIDE SERPL-SCNC: 113 MMOL/L (ref 101–110)
CO2 SERPL-SCNC: 22 MMOL/L (ref 21–32)
CREAT SERPL-MCNC: 0.64 MG/DL (ref 0.6–1)
DIFFERENTIAL METHOD BLD: ABNORMAL
EKG ATRIAL RATE: 77 BPM
EKG DIAGNOSIS: NORMAL
EKG P AXIS: 92 DEGREES
EKG P-R INTERVAL: 140 MS
EKG Q-T INTERVAL: 394 MS
EKG QRS DURATION: 83 MS
EKG QTC CALCULATION (BAZETT): 438 MS
EKG R AXIS: 88 DEGREES
EKG T AXIS: 69 DEGREES
EKG VENTRICULAR RATE: 74 BPM
EOSINOPHIL # BLD: 0 K/UL (ref 0–0.8)
EOSINOPHIL NFR BLD: 0 % (ref 0.5–7.8)
ERYTHROCYTE [DISTWIDTH] IN BLOOD BY AUTOMATED COUNT: 18.1 % (ref 11.9–14.6)
GLOBULIN SER CALC-MCNC: 3.7 G/DL (ref 2.8–4.5)
GLUCOSE SERPL-MCNC: 106 MG/DL (ref 65–100)
HCG UR QL: NEGATIVE
HCT VFR BLD AUTO: 22.9 % (ref 35.8–46.3)
HGB BLD-MCNC: 6.6 G/DL (ref 11.7–15.4)
HISTORY CHECK: NORMAL
IMM GRANULOCYTES # BLD AUTO: 0.1 K/UL (ref 0–0.5)
IMM GRANULOCYTES NFR BLD AUTO: 1 % (ref 0–5)
LYMPHOCYTES # BLD: 2 K/UL (ref 0.5–4.6)
LYMPHOCYTES NFR BLD: 21 % (ref 13–44)
MCH RBC QN AUTO: 21.2 PG (ref 26.1–32.9)
MCHC RBC AUTO-ENTMCNC: 28.8 G/DL (ref 31.4–35)
MCV RBC AUTO: 73.6 FL (ref 82–102)
MONOCYTES # BLD: 0.4 K/UL (ref 0.1–1.3)
MONOCYTES NFR BLD: 4 % (ref 4–12)
NEUTS SEG # BLD: 7.2 K/UL (ref 1.7–8.2)
NEUTS SEG NFR BLD: 74 % (ref 43–78)
NRBC # BLD: 0 K/UL (ref 0–0.2)
PLATELET # BLD AUTO: 236 K/UL (ref 150–450)
PMV BLD AUTO: 10.7 FL (ref 9.4–12.3)
POTASSIUM SERPL-SCNC: 4.1 MMOL/L (ref 3.5–5.1)
PROT SERPL-MCNC: 7.6 G/DL (ref 6.3–8.2)
RBC # BLD AUTO: 3.11 M/UL (ref 4.05–5.2)
SODIUM SERPL-SCNC: 142 MMOL/L (ref 133–143)
WBC # BLD AUTO: 9.8 K/UL (ref 4.3–11.1)

## 2023-05-30 PROCEDURE — 80053 COMPREHEN METABOLIC PANEL: CPT

## 2023-05-30 PROCEDURE — 93010 ELECTROCARDIOGRAM REPORT: CPT | Performed by: INTERNAL MEDICINE

## 2023-05-30 PROCEDURE — 99285 EMERGENCY DEPT VISIT HI MDM: CPT

## 2023-05-30 PROCEDURE — 71045 X-RAY EXAM CHEST 1 VIEW: CPT

## 2023-05-30 PROCEDURE — P9016 RBC LEUKOCYTES REDUCED: HCPCS

## 2023-05-30 PROCEDURE — 94762 N-INVAS EAR/PLS OXIMTRY CONT: CPT

## 2023-05-30 PROCEDURE — 86901 BLOOD TYPING SEROLOGIC RH(D): CPT

## 2023-05-30 PROCEDURE — 86923 COMPATIBILITY TEST ELECTRIC: CPT

## 2023-05-30 PROCEDURE — 2580000003 HC RX 258: Performed by: EMERGENCY MEDICINE

## 2023-05-30 PROCEDURE — 81025 URINE PREGNANCY TEST: CPT

## 2023-05-30 PROCEDURE — 86850 RBC ANTIBODY SCREEN: CPT

## 2023-05-30 PROCEDURE — 85025 COMPLETE CBC W/AUTO DIFF WBC: CPT

## 2023-05-30 PROCEDURE — 86900 BLOOD TYPING SEROLOGIC ABO: CPT

## 2023-05-30 PROCEDURE — 93005 ELECTROCARDIOGRAM TRACING: CPT | Performed by: EMERGENCY MEDICINE

## 2023-05-30 RX ORDER — SODIUM CHLORIDE 9 MG/ML
INJECTION, SOLUTION INTRAVENOUS PRN
Status: DISCONTINUED | OUTPATIENT
Start: 2023-05-30 | End: 2023-05-30 | Stop reason: HOSPADM

## 2023-05-30 RX ORDER — 0.9 % SODIUM CHLORIDE 0.9 %
1000 INTRAVENOUS SOLUTION INTRAVENOUS
Status: COMPLETED | OUTPATIENT
Start: 2023-05-30 | End: 2023-05-30

## 2023-05-30 RX ADMIN — SODIUM CHLORIDE 1000 ML: 9 INJECTION, SOLUTION INTRAVENOUS at 07:59

## 2023-05-30 ASSESSMENT — PAIN SCALES - GENERAL: PAINLEVEL_OUTOF10: 3

## 2023-05-30 ASSESSMENT — PAIN - FUNCTIONAL ASSESSMENT: PAIN_FUNCTIONAL_ASSESSMENT: 0-10

## 2023-05-30 ASSESSMENT — PAIN DESCRIPTION - LOCATION: LOCATION: HEAD

## 2023-05-30 NOTE — ED TRIAGE NOTES
Patient ambulatory to triage. Patient states that last night around 1 am she was using the restroom after having sexual intercourse. Patient states she began to feel dizzy. Patient states she passed and then immediately vomited.

## 2023-05-30 NOTE — ED NOTES
I have reviewed discharge instructions with the patient. The patient verbalized understanding. Patient left ED via Discharge Method: ambulatory to Home with family. Opportunity for questions and clarification provided. Patient given 0 scripts. To continue your aftercare when you leave the hospital, you may receive an automated call from our care team to check in on how you are doing. This is a free service and part of our promise to provide the best care and service to meet your aftercare needs.  If you have questions, or wish to unsubscribe from this service please call [phone]. Thank you for Choosing our [facility] Emergency Department.         Felicia Wilkerson RN  05/30/23 5292

## 2023-05-30 NOTE — DISCHARGE INSTRUCTIONS
Start taking your iron supplements twice daily along with Colace or a fiber supplement to prevent constipation. Follow-up with your GYN this week and have them recheck your hemoglobin. You need to be drinking 1 to 2 L of water daily. If you begin developing any new or concerning symptoms or have any repeat lightheaded or syncopal event she need to return immediately to the emergency department at that time.

## 2023-05-30 NOTE — ED PROVIDER NOTES
Emergency Department Provider Note       PCP: Pcp No   Age: 35 y.o. Sex: female     DISPOSITION Decision To Discharge 05/30/2023 10:25:02 AM       ICD-10-CM    1. Syncope and collapse  R55       2. Anemia due to acute blood loss  D62       3. Abnormal vaginal bleeding  N93.9           Medical Decision Making     Complexity of Problems Addressed:  1 or more acute illnesses that pose a threat to life or bodily function. Data Reviewed and Analyzed:  Category 1:   I independently ordered and reviewed each unique test.  I reviewed external records: provider visit note from PCP. I reviewed external records: provider visit note from outside specialist.  I reviewed external records: previous lab results from outside ED. I reviewed external records: previous imaging study including radiologist interpretation. Category 2:   I independently ordered and interpreted the ED EKG in the absence of a Cardiologist.    Rate: 74  EKG Interpretation: EKG Interpretation: sinus rhythm, no evidence of arrhythmia  ST Segments: Normal ST segments - NO STEMI  I interpreted the X-rays no pneumonia. Category 3: Discussion of management or test interpretation. DDX:    Anemia, orthostatic hypotension, dehydration, electrolyte abnormality, orthostatic hypotension, pregnancy, pneumonia, cardiac arrhythmia,        Risk of Complications and/or Morbidity of Patient Management:  Patient was discharged risks and benefits of hospitalization were considered. Shared medical decision making was utilized in creating the patients health plan today. ED Course as of 05/30/23 1026   Tue May 30, 2023   8995 I talked to the patient about the findings on the blood work here in the emergency department and my suggestion that she get a blood transfusion. Patient wants to think about this for a moment. Risk and benefits were discussed.   Patient is adamant that the bleeding is vaginally and notes that she had a heavy cycle in the last 2

## 2023-05-31 ENCOUNTER — APPOINTMENT (OUTPATIENT)
Dept: GENERAL RADIOLOGY | Age: 33
End: 2023-05-31

## 2023-05-31 ENCOUNTER — HOSPITAL ENCOUNTER (EMERGENCY)
Age: 33
Discharge: HOME | End: 2023-05-31
Attending: EMERGENCY MEDICINE
Payer: MEDICAID

## 2023-05-31 VITALS
BODY MASS INDEX: 23.92 KG/M2 | OXYGEN SATURATION: 100 % | RESPIRATION RATE: 16 BRPM | WEIGHT: 130 LBS | TEMPERATURE: 99.3 F | DIASTOLIC BLOOD PRESSURE: 62 MMHG | HEIGHT: 62 IN | SYSTOLIC BLOOD PRESSURE: 105 MMHG | HEART RATE: 75 BPM

## 2023-05-31 DIAGNOSIS — R07.9 CHEST PAIN, UNSPECIFIED TYPE: Primary | ICD-10-CM

## 2023-05-31 LAB
ABO + RH BLD: NORMAL
ALBUMIN SERPL-MCNC: 3.7 G/DL (ref 3.5–5)
ALBUMIN/GLOB SERPL: 1.1 (ref 0.4–1.6)
ALP SERPL-CCNC: 63 U/L (ref 50–136)
ALT SERPL-CCNC: 29 U/L (ref 12–65)
ANION GAP SERPL CALC-SCNC: 5 MMOL/L (ref 2–11)
AST SERPL-CCNC: 48 U/L (ref 15–37)
BILIRUB SERPL-MCNC: 0.5 MG/DL (ref 0.2–1.1)
BLD PROD TYP BPU: NORMAL
BLOOD BANK DISPENSE STATUS: NORMAL
BLOOD GROUP ANTIBODIES SERPL: NORMAL
BPU ID: NORMAL
BUN SERPL-MCNC: 10 MG/DL (ref 6–23)
CALCIUM SERPL-MCNC: 9.3 MG/DL (ref 8.3–10.4)
CHLORIDE SERPL-SCNC: 112 MMOL/L (ref 101–110)
CO2 SERPL-SCNC: 21 MMOL/L (ref 21–32)
CREAT SERPL-MCNC: 0.7 MG/DL (ref 0.6–1)
CROSSMATCH RESULT: NORMAL
EKG ATRIAL RATE: 76 BPM
EKG DIAGNOSIS: NORMAL
EKG P AXIS: 37 DEGREES
EKG P-R INTERVAL: 118 MS
EKG Q-T INTERVAL: 386 MS
EKG QRS DURATION: 86 MS
EKG QTC CALCULATION (BAZETT): 434 MS
EKG R AXIS: 84 DEGREES
EKG T AXIS: 59 DEGREES
EKG VENTRICULAR RATE: 76 BPM
ERYTHROCYTE [DISTWIDTH] IN BLOOD BY AUTOMATED COUNT: 18.6 % (ref 11.9–14.6)
GLOBULIN SER CALC-MCNC: 3.5 G/DL (ref 2.8–4.5)
GLUCOSE SERPL-MCNC: 87 MG/DL (ref 65–100)
HCT VFR BLD AUTO: 26.6 % (ref 35.8–46.3)
HGB BLD-MCNC: 7.9 G/DL (ref 11.7–15.4)
MAGNESIUM SERPL-MCNC: 2 MG/DL (ref 1.8–2.4)
MCH RBC QN AUTO: 22.6 PG (ref 26.1–32.9)
MCHC RBC AUTO-ENTMCNC: 29.7 G/DL (ref 31.4–35)
MCV RBC AUTO: 76.2 FL (ref 82–102)
NRBC # BLD: 0.02 K/UL (ref 0–0.2)
PLATELET # BLD AUTO: 290 K/UL (ref 150–450)
PMV BLD AUTO: 11.6 FL (ref 9.4–12.3)
POTASSIUM SERPL-SCNC: 4.7 MMOL/L (ref 3.5–5.1)
PROT SERPL-MCNC: 7.2 G/DL (ref 6.3–8.2)
RBC # BLD AUTO: 3.49 M/UL (ref 4.05–5.2)
SODIUM SERPL-SCNC: 138 MMOL/L (ref 133–143)
SPECIMEN EXP DATE BLD: NORMAL
TROPONIN I SERPL HS-MCNC: 3.1 PG/ML (ref 0–14)
UNIT DIVISION: 0
WBC # BLD AUTO: 5.8 K/UL (ref 4.3–11.1)

## 2023-05-31 PROCEDURE — 71046 X-RAY EXAM CHEST 2 VIEWS: CPT

## 2023-05-31 PROCEDURE — 80053 COMPREHEN METABOLIC PANEL: CPT

## 2023-05-31 PROCEDURE — 93005 ELECTROCARDIOGRAM TRACING: CPT | Performed by: EMERGENCY MEDICINE

## 2023-05-31 PROCEDURE — 6360000002 HC RX W HCPCS: Performed by: STUDENT IN AN ORGANIZED HEALTH CARE EDUCATION/TRAINING PROGRAM

## 2023-05-31 PROCEDURE — 96374 THER/PROPH/DIAG INJ IV PUSH: CPT

## 2023-05-31 PROCEDURE — 99285 EMERGENCY DEPT VISIT HI MDM: CPT

## 2023-05-31 PROCEDURE — 84484 ASSAY OF TROPONIN QUANT: CPT

## 2023-05-31 PROCEDURE — 93010 ELECTROCARDIOGRAM REPORT: CPT | Performed by: INTERNAL MEDICINE

## 2023-05-31 PROCEDURE — 83735 ASSAY OF MAGNESIUM: CPT

## 2023-05-31 PROCEDURE — 85027 COMPLETE CBC AUTOMATED: CPT

## 2023-05-31 RX ORDER — LORAZEPAM 2 MG/ML
0.5 INJECTION INTRAMUSCULAR ONCE
Status: COMPLETED | OUTPATIENT
Start: 2023-05-31 | End: 2023-05-31

## 2023-05-31 RX ADMIN — LORAZEPAM 0.5 MG: 2 INJECTION INTRAMUSCULAR; INTRAVENOUS at 12:40

## 2023-05-31 ASSESSMENT — ENCOUNTER SYMPTOMS
PHOTOPHOBIA: 0
TROUBLE SWALLOWING: 0
VOMITING: 0
FACIAL SWELLING: 0
SHORTNESS OF BREATH: 1
COUGH: 0
ABDOMINAL PAIN: 1

## 2023-05-31 NOTE — ED NOTES
I have reviewed discharge instructions with the patient. The patient verbalized understanding. Patient left ED via Discharge Method: ambulatory to Home with (self,). Opportunity for questions and clarification provided. Patient given 0 scripts. Pt states that she feels better      To continue your aftercare when you leave the hospital, you may receive an automated call from our care team to check in on how you are doing. This is a free service and part of our promise to provide the best care and service to meet your aftercare needs.  If you have questions, or wish to unsubscribe from this service please call [phone]. Thank you for Choosing our [facility] Emergency Department.        Kathie Kendrick RN  05/31/23 5861

## 2023-05-31 NOTE — ED TRIAGE NOTES
Pt states that she was here yesterday after having a syncopal episode. Pt came here and had a blood transfusion and was discharged. Today pt had somewhat of a panic attack and started having some chest pain and shortness of breath. Pt's doctors recommended they come get checked out again.

## 2023-05-31 NOTE — ED PROVIDER NOTES
Emergency Department Provider Note       PCP: Pcp No   Age: 35 y.o. Sex: female     DISPOSITION Decision To Discharge 2023 01:20:15 PM       ICD-10-CM    1. Chest pain, unspecified type  R07.9           Medical Decision Making     Complexity of Problems Addressed:  1 or more acute illnesses that pose a threat to life or bodily function. Data Reviewed and Analyzed:  Category 1:   I independently ordered and reviewed each unique test.         Category 2:   I independently ordered and interpreted the ED EKG in the absence of a Cardiologist.    Rate: 76  EKG Interpretation: EKG Interpretation: sinus rhythm, no evidence of arrhythmia  ST Segments: Normal ST segments - NO STEMI  No S1Q3T3  I independently interpreted the cardiac monitor rhythm strip no arrhythmia or ischemia. I interpreted the X-rays no pneumothorax. Category 3: Discussion of management or test interpretation. In summary this is a 45-year-old female patient presented for evaluation of symptoms that I feel are most likely consistent with a panic attack. Her symptoms came on immediately after her boss told her that his sister  from the same thing that she was experiencing. It only lasted for about 30 minutes before resolving. Today her exam is reassuring with stable vitals. Her hemoglobin is improved from yesterday up to 7.9. She is not having any dizziness or lightheadedness. She has no pleuritic pain or dyspnea or continued chest pain or abnormal vitals to suggest pulmonary embolism. Her troponin was negative. Heart score of 0 indicating low risk. No pulse deficits or ripping or tearing pain to suggest dissection. Would expect pain to be ongoing. Plan for this patient is outpatient therapy. She will be discharged home and told to follow-up with OB/GYN. Counseled on signs return for. Patient has verbalized understanding and agreed with the plan. Discharged in stable condition.       Risk of Complications and/or

## 2023-05-31 NOTE — DISCHARGE INSTRUCTIONS
Your work-up was reassuring today. Your hemoglobin has improved. Please continue following up with your OB/GYN and family doctor. Return for new or worsening symptoms. As we discussed, I did not find a life threatening cause of your symptoms today. However, THAT DOES NOT MEAN IT COULD NOT DEVELOP. If you develop ANY new or worsening symptoms, it is critical that you return for re-evaluation. This includes any symptoms that are concerning to you, especially symptoms such as new or return to your chest pain, trouble breathing, loss of consciousness. If you do not return for re-evaluation, you risk serious complications, including death. yes

## 2023-05-31 NOTE — ED NOTES
Pt presents to the ED for c/o feeling bad again today. Pt was seen and treated yesterday for c/o chest pain and low HGB.   States that she continues to have chest pain     Harriet Ernandez RN  05/31/23 2790

## 2025-04-17 ENCOUNTER — OFFICE VISIT (OUTPATIENT)
Dept: PRIMARY CARE CLINIC | Facility: CLINIC | Age: 35
End: 2025-04-17
Payer: COMMERCIAL

## 2025-04-17 VITALS
WEIGHT: 163 LBS | DIASTOLIC BLOOD PRESSURE: 66 MMHG | HEIGHT: 62 IN | OXYGEN SATURATION: 96 % | HEART RATE: 77 BPM | BODY MASS INDEX: 30 KG/M2 | TEMPERATURE: 98.4 F | SYSTOLIC BLOOD PRESSURE: 112 MMHG | RESPIRATION RATE: 18 BRPM

## 2025-04-17 DIAGNOSIS — Z86.2 HX OF IRON DEFICIENCY ANEMIA: ICD-10-CM

## 2025-04-17 DIAGNOSIS — E66.3 OVERWEIGHT: ICD-10-CM

## 2025-04-17 DIAGNOSIS — E66.9 OBESITY, UNSPECIFIED CLASS, UNSPECIFIED OBESITY TYPE, UNSPECIFIED WHETHER SERIOUS COMORBIDITY PRESENT: ICD-10-CM

## 2025-04-17 DIAGNOSIS — Z01.818 PREOPERATIVE CLEARANCE: ICD-10-CM

## 2025-04-17 DIAGNOSIS — Z71.82 EXERCISE COUNSELING: ICD-10-CM

## 2025-04-17 DIAGNOSIS — D25.9 UTERINE LEIOMYOMA, UNSPECIFIED LOCATION: ICD-10-CM

## 2025-04-17 DIAGNOSIS — Z01.818 PREOPERATIVE CLEARANCE: Primary | ICD-10-CM

## 2025-04-17 DIAGNOSIS — Z71.3 DIETARY COUNSELING: ICD-10-CM

## 2025-04-17 LAB
ALBUMIN SERPL-MCNC: 4.2 G/DL (ref 3.5–5)
ALBUMIN/GLOB SERPL: 1.3 (ref 1–1.9)
ALP SERPL-CCNC: 77 U/L (ref 35–104)
ALT SERPL-CCNC: 21 U/L (ref 8–45)
ANION GAP SERPL CALC-SCNC: 9 MMOL/L (ref 7–16)
APPEARANCE UR: ABNORMAL
APTT PPP: 35.8 SEC (ref 23.3–37.4)
AST SERPL-CCNC: 19 U/L (ref 15–37)
BACTERIA URNS QL MICRO: ABNORMAL /HPF
BASOPHILS # BLD: 0.08 K/UL (ref 0–0.2)
BASOPHILS NFR BLD: 1.5 % (ref 0–2)
BILIRUB SERPL-MCNC: 0.2 MG/DL (ref 0–1.2)
BILIRUB UR QL: NEGATIVE
BUN SERPL-MCNC: 8 MG/DL (ref 6–23)
CALCIUM SERPL-MCNC: 9.8 MG/DL (ref 8.8–10.2)
CHLORIDE SERPL-SCNC: 106 MMOL/L (ref 98–107)
CHOLEST SERPL-MCNC: 156 MG/DL (ref 0–200)
CO2 SERPL-SCNC: 25 MMOL/L (ref 20–29)
COLOR UR: ABNORMAL
CREAT SERPL-MCNC: 0.78 MG/DL (ref 0.6–1.1)
DIFFERENTIAL METHOD BLD: ABNORMAL
EOSINOPHIL # BLD: 0.04 K/UL (ref 0–0.8)
EOSINOPHIL NFR BLD: 0.8 % (ref 0.5–7.8)
EPI CELLS #/AREA URNS HPF: ABNORMAL /HPF
ERYTHROCYTE [DISTWIDTH] IN BLOOD BY AUTOMATED COUNT: 25.2 % (ref 11.9–14.6)
EST. AVERAGE GLUCOSE BLD GHB EST-MCNC: 106 MG/DL
GLOBULIN SER CALC-MCNC: 3.2 G/DL (ref 2.3–3.5)
GLUCOSE SERPL-MCNC: 91 MG/DL (ref 70–99)
GLUCOSE UR STRIP.AUTO-MCNC: NEGATIVE MG/DL
HAV IGM SER QL: NONREACTIVE
HBA1C MFR BLD: 5.3 % (ref 0–5.6)
HBV SURFACE AG SER QL: NONREACTIVE
HCG SERPL QL: NEGATIVE
HCT VFR BLD AUTO: 39.8 % (ref 35.8–46.3)
HCV AB SER QL: NONREACTIVE
HDLC SERPL-MCNC: 52 MG/DL (ref 40–60)
HDLC SERPL: 3 (ref 0–5)
HGB BLD-MCNC: 12.8 G/DL (ref 11.7–15.4)
HGB UR QL STRIP: NEGATIVE
IMM GRANULOCYTES # BLD AUTO: 0.01 K/UL (ref 0–0.5)
IMM GRANULOCYTES NFR BLD AUTO: 0.2 % (ref 0–5)
INR PPP: 1
KETONES UR QL STRIP.AUTO: NEGATIVE MG/DL
LDLC SERPL CALC-MCNC: 88 MG/DL (ref 0–100)
LEUKOCYTE ESTERASE UR QL STRIP.AUTO: NEGATIVE
LYMPHOCYTES # BLD: 1.66 K/UL (ref 0.5–4.6)
LYMPHOCYTES NFR BLD: 31.6 % (ref 13–44)
MCH RBC QN AUTO: 25.9 PG (ref 26.1–32.9)
MCHC RBC AUTO-ENTMCNC: 32.2 G/DL (ref 31.4–35)
MCV RBC AUTO: 80.4 FL (ref 82–102)
MONOCYTES # BLD: 0.28 K/UL (ref 0.1–1.3)
MONOCYTES NFR BLD: 5.3 % (ref 4–12)
MUCOUS THREADS URNS QL MICRO: 0 /LPF
NEUTS SEG # BLD: 3.19 K/UL (ref 1.7–8.2)
NEUTS SEG NFR BLD: 60.6 % (ref 43–78)
NITRITE UR QL STRIP.AUTO: POSITIVE
NRBC # BLD: 0 K/UL (ref 0–0.2)
OTHER OBSERVATIONS: ABNORMAL
PH UR STRIP: 8 (ref 5–9)
PLATELET # BLD AUTO: 231 K/UL (ref 150–450)
PMV BLD AUTO: 10.8 FL (ref 9.4–12.3)
POTASSIUM SERPL-SCNC: 4.5 MMOL/L (ref 3.5–5.1)
PROT SERPL-MCNC: 7.4 G/DL (ref 6.3–8.2)
PROT UR STRIP-MCNC: NEGATIVE MG/DL
PROTHROMBIN TIME: 14 SEC (ref 11.3–14.9)
RBC # BLD AUTO: 4.95 M/UL (ref 4.05–5.2)
RBC #/AREA URNS HPF: ABNORMAL /HPF
SODIUM SERPL-SCNC: 139 MMOL/L (ref 136–145)
SP GR UR REFRACTOMETRY: 1.02 (ref 1–1.02)
T3 SERPL-MCNC: 1.59 NG/ML (ref 0.6–1.81)
T4 FREE SERPL-MCNC: 1 NG/DL (ref 0.9–1.7)
TRIGL SERPL-MCNC: 81 MG/DL (ref 0–150)
TSH, 3RD GENERATION: 1.26 UIU/ML (ref 0.27–4.2)
UROBILINOGEN UR QL STRIP.AUTO: 0.2 EU/DL (ref 0.2–1)
VLDLC SERPL CALC-MCNC: 16 MG/DL (ref 6–23)
WBC # BLD AUTO: 5.3 K/UL (ref 4.3–11.1)
WBC URNS QL MICRO: ABNORMAL /HPF

## 2025-04-17 PROCEDURE — 93000 ELECTROCARDIOGRAM COMPLETE: CPT | Performed by: FAMILY MEDICINE

## 2025-04-17 PROCEDURE — 99204 OFFICE O/P NEW MOD 45 MIN: CPT | Performed by: FAMILY MEDICINE

## 2025-04-17 RX ORDER — MULTIVIT WITH MINERALS/LUTEIN
1000 TABLET ORAL DAILY
COMMUNITY

## 2025-04-17 RX ORDER — FOLIC ACID 0.4 MG
1 TABLET ORAL DAILY
COMMUNITY

## 2025-04-17 SDOH — ECONOMIC STABILITY: FOOD INSECURITY: WITHIN THE PAST 12 MONTHS, YOU WORRIED THAT YOUR FOOD WOULD RUN OUT BEFORE YOU GOT MONEY TO BUY MORE.: NEVER TRUE

## 2025-04-17 SDOH — ECONOMIC STABILITY: FOOD INSECURITY: WITHIN THE PAST 12 MONTHS, THE FOOD YOU BOUGHT JUST DIDN'T LAST AND YOU DIDN'T HAVE MONEY TO GET MORE.: NEVER TRUE

## 2025-04-17 ASSESSMENT — PATIENT HEALTH QUESTIONNAIRE - PHQ9
SUM OF ALL RESPONSES TO PHQ QUESTIONS 1-9: 0
1. LITTLE INTEREST OR PLEASURE IN DOING THINGS: NOT AT ALL
SUM OF ALL RESPONSES TO PHQ QUESTIONS 1-9: 0
SUM OF ALL RESPONSES TO PHQ QUESTIONS 1-9: 0
2. FEELING DOWN, DEPRESSED OR HOPELESS: NOT AT ALL
SUM OF ALL RESPONSES TO PHQ QUESTIONS 1-9: 0

## 2025-04-17 ASSESSMENT — ENCOUNTER SYMPTOMS
RESPIRATORY NEGATIVE: 1
EYES NEGATIVE: 1
GASTROINTESTINAL NEGATIVE: 1
ALLERGIC/IMMUNOLOGIC NEGATIVE: 1

## 2025-04-18 LAB
HSV1 IGG SER IA-ACNC: NON REACTIVE
HSV2 IGG SER IA-ACNC: REACTIVE

## 2025-04-18 NOTE — PROGRESS NOTES
[facility]  [address]  [place] [address]  Office : [phone]  Fax : [phone]      Subjective:  The patient is a 35 y.o. female  who presents for f/u on multiple chronic medical conditions-good compliance with medications-npt here to get routeine labs and need refill on meds.no cardiopulmonary symptoms  +  Here tor PREOPERATIVE CLEARANCE FOR liposuction procedure  Multiple fibroids-seen obgy-unable to tolerate OCP/deposhot--pt has on and off menorrhagia--lately betterin -pt was advised hysterectomy-pt waiting  Iron def anemia-on and off supplement-hx of on and off menorrhagia    Patient Active Problem List   Diagnosis    Overweight    Dietary counseling    Exercise counseling    Hx of iron deficiency anemia    Uterine leiomyoma    Preoperative clearance       Past Medical History:   Diagnosis Date    Anemia     Fibroids        Past Surgical History:   Procedure Laterality Date     SECTION      x2  and     DILATION AND CURETTAGE OF UTERUS      UTERINE FIBROID SURGERY  2023       Social History     Socioeconomic History    Marital status: Single     Spouse name: Not on file    Number of children: Not on file    Years of education: Not on file    Highest education level: Not on file   Occupational History    Not on file   Tobacco Use    Smoking status: Never     Passive exposure: Never    Smokeless tobacco: Never   Vaping Use    Vaping status: Never Used   Substance and Sexual Activity    Alcohol use: Never    Drug use: Never    Sexual activity: Yes     Partners: Male   Other Topics Concern    Not on file   Social History Narrative    Not on file     Social Drivers of Health     Financial Resource Strain: Low Risk  (3/3/2023)    Received from [facility]    Financial Resource Strain     Difficulty Paying Living Expenses: Not hard at all     Difficulty Paying Medical Expenses: No   Food Insecurity: No Food Insecurity (2025)    Hunger Vital Sign    
no

## 2025-04-21 ENCOUNTER — RESULTS FOLLOW-UP (OUTPATIENT)
Dept: PRIMARY CARE CLINIC | Facility: CLINIC | Age: 35
End: 2025-04-21

## 2025-04-21 PROBLEM — N39.0 URINARY TRACT INFECTION WITHOUT HEMATURIA: Status: ACTIVE | Noted: 2025-04-21

## 2025-04-21 PROBLEM — Z86.19 HX OF HERPES SIMPLEX TYPE 2 INFECTION: Status: ACTIVE | Noted: 2025-04-21

## 2025-04-22 ENCOUNTER — PATIENT MESSAGE (OUTPATIENT)
Dept: PRIMARY CARE CLINIC | Facility: CLINIC | Age: 35
End: 2025-04-22

## 2025-04-22 NOTE — TELEPHONE ENCOUNTER
Patient was notified; voiced understanding.  Pt needs the form before next week.  Can she get it this week?

## 2025-04-23 ENCOUNTER — TELEPHONE (OUTPATIENT)
Dept: PRIMARY CARE CLINIC | Facility: CLINIC | Age: 35
End: 2025-04-23

## 2025-04-25 ENCOUNTER — CLINICAL SUPPORT (OUTPATIENT)
Dept: PRIMARY CARE CLINIC | Facility: CLINIC | Age: 35
End: 2025-04-25

## 2025-04-25 DIAGNOSIS — N39.0 URINARY TRACT INFECTION WITHOUT HEMATURIA, SITE UNSPECIFIED: Primary | ICD-10-CM

## 2025-04-25 LAB
BILIRUBIN, URINE, POC: NEGATIVE
BLOOD URINE, POC: NORMAL
GLUCOSE URINE, POC: NEGATIVE
KETONES, URINE, POC: NEGATIVE
LEUKOCYTE ESTERASE, URINE, POC: NEGATIVE
NITRITE, URINE, POC: NEGATIVE
PH, URINE, POC: 6 (ref 4.6–8)
PROTEIN,URINE, POC: NEGATIVE
SPECIFIC GRAVITY, URINE, POC: 1.02 (ref 1–1.03)
URINALYSIS CLARITY, POC: CLEAR
URINALYSIS COLOR, POC: YELLOW
UROBILINOGEN, POC: NORMAL

## 2025-04-28 ENCOUNTER — TELEPHONE (OUTPATIENT)
Dept: PRIMARY CARE CLINIC | Facility: CLINIC | Age: 35
End: 2025-04-28

## 2025-04-28 DIAGNOSIS — Z01.818 PREOPERATIVE CLEARANCE: Primary | ICD-10-CM

## 2025-04-28 NOTE — TELEPHONE ENCOUNTER
The pt called stating she had some labs done on 4/17/25 in preparation for a surgery set on 5/7/25.  She states they just notified her that they have everything they need except her HIV results. She is calling to follow-up on this result. She would like a call back at [phone]    I reviewed the pts chart and do not see an order for the HIV test. I will forward the msg to Dr. Robins's CMA to discuss with her.

## 2025-04-29 ENCOUNTER — TELEPHONE (OUTPATIENT)
Dept: PRIMARY CARE CLINIC | Facility: CLINIC | Age: 35
End: 2025-04-29

## 2025-04-29 DIAGNOSIS — Z86.2 HX OF IRON DEFICIENCY ANEMIA: ICD-10-CM

## 2025-04-29 DIAGNOSIS — Z01.818 PREOPERATIVE CLEARANCE: ICD-10-CM

## 2025-04-29 LAB
HIV 1+2 AB+HIV1 P24 AG SERPL QL IA: NONREACTIVE
HIV 1/2 RESULT COMMENT: NORMAL
IRON SERPL-MCNC: 50 UG/DL (ref 35–100)

## 2025-04-29 NOTE — TELEPHONE ENCOUNTER
Hello they said they are missing the hiv test.        4/25/25  4:18 PM  Yes it has your medical information.  I have to send it as a safe email.    Last read by Luiz Hadley at 2:50PM on 4/28/2025.  Luiz Hadley to [facility] [address] Clinical Staff (supporting Iris Robins MD)  TP      4/25/25  3:21 PM  Sorry to bother you again but   It came in encrypted

## 2025-05-17 PROBLEM — Z01.818 PREOPERATIVE CLEARANCE: Status: RESOLVED | Noted: 2025-04-17 | Resolved: 2025-05-17

## 2025-08-24 ENCOUNTER — HOSPITAL ENCOUNTER (EMERGENCY)
Age: 35
Discharge: HOME | End: 2025-08-24
Payer: COMMERCIAL

## 2025-08-24 VITALS
TEMPERATURE: 98.2 F | DIASTOLIC BLOOD PRESSURE: 61 MMHG | SYSTOLIC BLOOD PRESSURE: 104 MMHG | HEART RATE: 79 BPM | OXYGEN SATURATION: 98 % | RESPIRATION RATE: 18 BRPM

## 2025-08-24 DIAGNOSIS — H00.011 HORDEOLUM EXTERNUM OF RIGHT UPPER EYELID: Primary | ICD-10-CM

## 2025-08-24 PROCEDURE — 99283 EMERGENCY DEPT VISIT LOW MDM: CPT
